# Patient Record
Sex: FEMALE | Race: WHITE | NOT HISPANIC OR LATINO | Employment: UNEMPLOYED | URBAN - METROPOLITAN AREA
[De-identification: names, ages, dates, MRNs, and addresses within clinical notes are randomized per-mention and may not be internally consistent; named-entity substitution may affect disease eponyms.]

---
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
## 2023-05-05 DIAGNOSIS — E06.3 HYPOTHYROIDISM DUE TO HASHIMOTO'S THYROIDITIS: ICD-10-CM

## 2023-05-05 RX ORDER — LEVOTHYROXINE SODIUM 150 UG/1
TABLET ORAL
Qty: 90 TABLET | Refills: 1 | Status: SHIPPED | OUTPATIENT
Start: 2023-05-05 | End: 2023-11-01

## 2023-05-05 NOTE — TELEPHONE ENCOUNTER
Rx sent. Please notify patient     Franco Colindres PA-C  [facility]

## 2023-05-05 NOTE — TELEPHONE ENCOUNTER
Patient calling about her levothyroxine. She is wanting the pharmacy switched to the [facility] in [place] (pended) as she is going to be there until august. Please advise the switch and call patient once complete.

## 2023-11-01 DIAGNOSIS — E06.3 HYPOTHYROIDISM DUE TO HASHIMOTO'S THYROIDITIS: ICD-10-CM

## 2023-11-01 RX ORDER — LEVOTHYROXINE SODIUM 150 UG/1
TABLET ORAL
Qty: 30 TABLET | Refills: 0 | Status: SHIPPED | OUTPATIENT
Start: 2023-11-01

## 2023-11-01 NOTE — TELEPHONE ENCOUNTER
No new complaints--nurisng staff noted elevatd bp and I did ok restarting norvasc    Review of Systems   Constitutional: Negative.    HENT: Negative.    Eyes: Negative.    Respiratory: Negative.    Cardiovascular: Negative.    Gastrointestinal: Negative.    Endocrine: Negative.    Genitourinary: Negative.    Musculoskeletal: Negative.    Skin: Negative.    Allergic/Immunologic: Negative.    Neurological: Negative.    Hematological: Negative.    Psychiatric/Behavioral: Negative.      Temp:  [97.8 °F (36.6 °C)-98.8 °F (37.1 °C)] 97.8 °F (36.6 °C)  Heart Rate:  [79-89] 86  Resp:  [20] 20  BP: (153-174)/(63-93) 174/93  I/O last 3 completed shifts:  In: 1560.4 [P.O.:240; I.V.:1170.4; IV Piggyback:150]  Out: 502 [Urine:502]  No intake/output data recorded.    Physical Exam   Constitutional: She is oriented to person, place, and time. She appears well-developed and well-nourished.   HENT:   Head: Normocephalic and atraumatic.   Right Ear: External ear normal.   Left Ear: External ear normal.   Nose: Nose normal.   Mouth/Throat: Oropharynx is clear and moist.   Eyes: Pupils are equal, round, and reactive to light. Conjunctivae and EOM are normal.   Neck: Normal range of motion. Neck supple.   Cardiovascular: Normal rate, regular rhythm, normal heart sounds and intact distal pulses.   Pulmonary/Chest: Effort normal.   Abdominal: Soft. Bowel sounds are normal.   Musculoskeletal: Normal range of motion.   Neurological: She is alert and oriented to person, place, and time.   Skin: Skin is warm.   Psychiatric: She has a normal mood and affect. Her behavior is normal. Judgment and thought content normal.   Nursing note and vitals reviewed.        Sepsis (CMS/HCC)    Pneumonia due to infectious organism    Restart norvasc--contiunue home meds     Refilled 1 time only, please call patient to schedule for Office visit and Lab only.

## 2024-10-01 PROBLEM — E66.9 OBESITY, UNSPECIFIED: Status: ACTIVE | Noted: 2017-09-19

## 2025-03-19 NOTE — TELEPHONE ENCOUNTER
Caller: Mabel   Relationship: [facility]  Best call back number:  extension [phone]   Who are you requesting to speak with (clinical staff, provider, specific staff member): MA or Provider    What was the call regarding: Mabel from [facility] called to ask for an extension on the referral for this patient due to her rescheduling her original November appointment for March. Mabel states that all other codes are good until July but the code for a new outpatient exam has  as of 2025. Was wondering if this is possible to do without starting the process over. I advised that I would get this to the correct team so we could figure out the best way to move forward for patient.    Please advise, thanks!    Please tell mom that I cannot continue the 137 mcg dose as labs show that the dose is too high. Recommend rechecking labs in 2 month(s) after switching to 125 mcg dose.     Thanks,  Lyndsey Segundo MD.